# Patient Record
Sex: MALE | Race: WHITE
[De-identification: names, ages, dates, MRNs, and addresses within clinical notes are randomized per-mention and may not be internally consistent; named-entity substitution may affect disease eponyms.]

---

## 2017-05-27 ENCOUNTER — HOSPITAL ENCOUNTER (OUTPATIENT)
Dept: HOSPITAL 76 - DI | Age: 67
Discharge: HOME | End: 2017-05-27
Attending: FAMILY MEDICINE
Payer: COMMERCIAL

## 2017-05-27 DIAGNOSIS — K76.0: Primary | ICD-10-CM

## 2017-05-27 PROCEDURE — 76705 ECHO EXAM OF ABDOMEN: CPT

## 2017-05-27 NOTE — ULTRASOUND REPORT
EXAM:

ABDOMEN ULTRASOUND LIMITED, RUQ

 

EXAM DATE: 5/27/2017 01:35 PM.

 

CLINICAL HISTORY: Abdominal pain, right upper quadrant.

 

COMPARISON: None.

 

TECHNIQUE: Real-time scanning was performed with static images obtained. 

 

FINDINGS: 

Liver: Liver parenchyma is heterogeneous and markedly hyperechoic. No discrete liver masses or intrah
epatic bile duct dilation.  However, evaluation for masses is limited secondary to the echogenicity. 
17.4 cm. Main portal vein flow: Hepatopetal.

 

Gallbladder: Normal. No stones, wall thickening, or sonographic Coley's sign. 

 

Biliary System: CBD measures 7.1 mm. No intrahepatic or extrahepatic ductal dilatation.

 

Right kidney: Length measures 11.9 cm. No hydronephrosis. 

 

Other: None. 

 

IMPRESSION: 

1. Enlarged fatty liver. No mass. 

2. Normal gallbladder and common bile duct.

 

[facility]

Referring Provider Line: [phone]

 

SITE ID: [id number]

## 2018-04-06 ENCOUNTER — HOSPITAL ENCOUNTER (EMERGENCY)
Dept: HOSPITAL 76 - ED | Age: 68
Discharge: HOME | End: 2018-04-06
Payer: COMMERCIAL

## 2018-04-06 VITALS — SYSTOLIC BLOOD PRESSURE: 154 MMHG | DIASTOLIC BLOOD PRESSURE: 93 MMHG

## 2018-04-06 DIAGNOSIS — I10: ICD-10-CM

## 2018-04-06 DIAGNOSIS — F02.80: ICD-10-CM

## 2018-04-06 DIAGNOSIS — M25.761: Primary | ICD-10-CM

## 2018-04-06 DIAGNOSIS — G30.9: ICD-10-CM

## 2018-04-06 DIAGNOSIS — M11.261: ICD-10-CM

## 2018-04-06 DIAGNOSIS — E78.00: ICD-10-CM

## 2018-04-06 DIAGNOSIS — E03.9: ICD-10-CM

## 2018-04-06 PROCEDURE — 99283 EMERGENCY DEPT VISIT LOW MDM: CPT

## 2018-04-06 NOTE — XRAY REPORT
EXAM:

RIGHT KNEE RADIOGRAPHY

 

EXAM DATE: 4/6/2018 08:39 PM.

 

CLINICAL HISTORY: Knee pain.

 

COMPARISON: None.

 

TECHNIQUE: 4 views.

 

FINDINGS: 

Bones:  No fracture or focal bony lesion. 

 

Joints:  No evidence of dislocation. Joint spacing is maintained. There are patellofemoral compartmen
t marginal osteophytes. There is no chondrocalcinosis. 

 

Soft Tissues:  No unexpected soft tissue findings. 

 

IMPRESSION: 

1. No evidence of fracture or dislocation. 

2. No joint effusion. 

3. There is chondrocalcinosis. 

4. There are small patellofemoral compartment marginal osteophytes.

 

[facility]  

Referring Provider Line: [phone]

 

SITE ID: [id number]

## 2018-04-06 NOTE — ED PHYSICIAN DOCUMENTATION
PD HPI LOWER EXT INJURY





- Stated complaint


Stated Complaint: BACK PX,RT KNEE PX





- Chief complaint


Chief Complaint: Ext Problem





- History obtained from


History obtained from: Patient





- History of Present Illness


PD HPI LOW EXT INJURY LOCATION: Other (He went for a walk a little under week 

ago and that night developed some right thoracic back pain was much worse with 

bending or lifting.  He went to his doctor was prescribed a muscle relaxer, 

that is now much better but starting 2 nights ago he developed atraumatic right 

knee pain and popping which is at times severe without specific trauma, swelling

, fevers.)





Review of Systems


Constitutional: denies: Fever, Chills


Cardiac: denies: Chest pain / pressure, Palpitations


Respiratory: denies: Dyspnea, Cough





PD PAST MEDICAL HISTORY





- Past Medical History


Past Medical History: Yes


Cardiovascular: Hypertension, High cholesterol


Respiratory: Sleep apnea


Neuro: Alzhiemer's


Endocrine/Autoimmune: HyPOthyroidism


GI: None


: None


HEENT: None


Psych: None


Musculoskeletal: Osteoporosis


Derm: None





- Past Surgical History


Past Surgical History: Yes


Ortho: Arthroscopic surgery


HEENT: Tonsil/Adenoidectomy





- Present Medications


Home Medications: 


 Ambulatory Orders











 Medication  Instructions  Recorded  Confirmed


 


Baclofen 10 mg PO TID 04/06/18 04/06/18


 


HYDROcod/ACETAM 5/325 [Norco 5/325] 1 - 2 ea PO Q6H PRN #10 tablet 04/06/18 


 


Ibuprofen 2 cap PO Q6HR PRN 04/06/18 04/06/18


 


Levothyroxine [Synthroid] 1 tab PO DAILY 04/06/18 04/06/18


 


Simvastatin 1 tab PO DAILY 04/06/18 04/06/18














- Allergies


Allergies/Adverse Reactions: 


 Allergies











Allergy/AdvReac Type Severity Reaction Status Date / Time


 


No Known Drug Allergies Allergy   Verified 04/06/18 18:50














- Social History


Does the pt smoke?: No


Smoking Status: Never smoker


Does the pt drink ETOH?: Yes


Does the pt have substance abuse?: No





- Immunizations


Immunizations are current?: Yes





- POLST


Patient has POLST: No





PD ED PE NORMAL





- Vitals


Vital signs reviewed: Yes





- General


General: Alert and oriented X 3, No acute distress





- Cardiac


Cardiac: RRR, No murmur





- Respiratory


Respiratory: No respiratory distress, Clear bilaterally





- Abdomen


Abdomen: Non tender





- Back


Back: No spinal TTP





- Derm


Derm: Normal color, Warm and dry





- Extremities


Extremities: Other (Right knee is nontender without an effusion, there is no 

warmth or redness and he is ranging it painlessly.)





- Neuro


Neuro: Alert and oriented X 3, Normal speech





- Psych


Psych: Normal mood, Normal affect





Results





- Vitals


Vitals: 


 Vital Signs - 24 hr











  04/06/18





  18:42


 


Temperature 36.8 C


 


Heart Rate 77


 


Respiratory 16





Rate 


 


Blood Pressure 154/93 H


 


O2 Saturation 96








 Oxygen











O2 Source                      Room air

















- Rads (name of study)


  ** R Knee


Radiology: EMP read contemporaneously (Chondrocalcinosis and small osteophytes)





PD MEDICAL DECISION MAKING





- ED course


ED course: 





67-year-old gentleman with bad knee pain, better now, there is no evidence of 

infection or warmth or effusion on examination.  He does have evidence of 

osteophytes and calcific foreign bodies in the joint.  He was administered for 

Vicodin to go here and he will follow-up with his doctor on Monday.





Departure





- Departure


Disposition: 01 Home, Self Care


Clinical Impression: 


 Osteophyte determined by x-ray





Condition: Good


Record reviewed to determine appropriate education?: Yes


Instructions:  Knee Osteoarthritis


Follow-Up: 


Linda Torres MD [Primary Care Provider] - 04/09/18


Prescriptions: 


HYDROcod/ACETAM 5/325 [Norco 5/325] 1 - 2 ea PO Q6H PRN #10 tablet


 PRN Reason: Pain


Comments: 


Call your doctor to arrange a follow-up appointment, make the next available 

appointment.  In the interim, return anytime if worse or if new symptoms 

develop.





Your blood pressure was elevated today on check into the emergency department.  

This does not mean that you have hypertension, it is a common phenomenon to 

come to the emergency department and have elevated blood pressure.  I recommend 

that you see your primary care physician within the week to have it rechecked 

when you are feeling better.

## 2018-10-04 ENCOUNTER — HOSPITAL ENCOUNTER (OUTPATIENT)
Dept: HOSPITAL 76 - DI | Age: 68
Discharge: HOME | End: 2018-10-04
Attending: ORTHOPAEDIC SURGERY
Payer: COMMERCIAL

## 2018-10-04 DIAGNOSIS — M23.203: Primary | ICD-10-CM

## 2018-10-04 DIAGNOSIS — M17.11: ICD-10-CM

## 2018-11-09 ENCOUNTER — HOSPITAL ENCOUNTER (OUTPATIENT)
Dept: HOSPITAL 76 - DI | Age: 68
Discharge: HOME | End: 2018-11-09
Attending: ORTHOPAEDIC SURGERY
Payer: COMMERCIAL

## 2018-11-09 DIAGNOSIS — S93.491D: ICD-10-CM

## 2018-11-09 DIAGNOSIS — S93.491A: Primary | ICD-10-CM

## 2018-11-09 DIAGNOSIS — S93.411D: ICD-10-CM

## 2018-11-09 DIAGNOSIS — M19.071: ICD-10-CM

## 2018-11-09 DIAGNOSIS — M66.861: ICD-10-CM

## 2018-11-09 NOTE — MRI REPORT
Reason:  NONTRAUMATIC RUPTURE OF TIBIALIS POSTERIOR TENDON

Procedure Date:  11/09/2018   

Accession Number:  [id number] / [id number]                    

Procedure:  MRI - Foot RT W/O CPT Code:  

 

FULL RESULT:

 

 

EXAM:

RIGHT ANKLE/HINDFOOT MRI WITHOUT CONTRAST

 

EXAM DATE: 11/9/2018 10:15 AM.

 

CLINICAL HISTORY: Nontraumatic rupture of tibialis posterior tendon.

 

COMPARISON: FOOT 3 VIEW RT 10/25/2018 10:01 AM.

 

TECHNIQUE: Multiplanar, multisequence T1-weighted and fluid-sensitive 

sequences of the ankle/hindfoot without contrast. Other: None.

 

FINDINGS:

Bones: There is thinning of the hyaline cartilage of the talonavicular 

joint with subchondral edema, cyst formation and osteophyte formation 

consistent with moderate osteoarthritis. There is subchondral edema and 

sclerosis in the posterior process of the talus at the posterior subtalar 

joint. There is subchondral sclerosis of the adjacent calcaneum as well. 

The findings are consistent with osteoarthritis, possibly secondary to 

prior trauma.

 

Articular Cartilage: See above.

 

Ligaments: The tibiofibular ligaments are intact. There is an acute grade 

3 tear of the anterior talofibular ligament, the torn ligament fibers are 

visible anterior to the talus. There is an old partial-thickness tear of 

the calcaneofibular and posterior talofibular ligaments. There is a 

high-grade partial-thickness tear of the spring ligament. The remainder 

of the deltoid ligament appears thinned, suggesting a prior 

partial-thickness tear.

 

Anterior Tendons: The tibialis anterior, extensor hallucis longus, and 

extensor digitorum longus tendons are unremarkable.

 

Medial Tendons: There is thickening and increased T2 signal within the 

tibialis posterior consistent with tendinosis and a partial-thickness 

tear immediately adjacent to its navicular insertion. The remaining 

medial tendons appear unremarkable.

 

Lateral Tendons: The peroneal tendons appear unremarkable.

 

Achilles Tendon: The Achilles tendon appears slightly thickened 

throughout its visible length, without significant alteration in signal. 

The findings may indicate low-grade Achilles tendinosis.

 

Musculature: No edema or fatty atrophy.

 

Other: Small ankle joint effusion. The contents of the sinus tarsi and 

tarsal tunnel are unremarkable. No plantar fasciitis. There is 

subcutaneous edema surrounding the ankle.

IMPRESSION:

1. Acute grade 3 tear of the anterior talofibular ligament.

2. Prior partial-thickness tears of the spring, deltoid ligament, 

calcaneofibular and posterior talofibular ligaments.

3. 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 

talonavicular joint with joint effusions, which may indicate prior trauma.

4. Tibialis posterior tendinosis and partial-thickness tear adjacent to 

its navicular insertion.

5. Possible low-grade Achilles tendinosis.

 

[facility] MUSCULOSKELETAL RADIOLOGY SECTION

## 2018-12-19 ENCOUNTER — HOSPITAL ENCOUNTER (OUTPATIENT)
Dept: HOSPITAL 76 - DI | Age: 68
Discharge: HOME | End: 2018-12-19
Attending: FAMILY MEDICINE
Payer: COMMERCIAL

## 2018-12-19 DIAGNOSIS — R31.0: Primary | ICD-10-CM

## 2018-12-19 LAB
ANION GAP SERPL CALCULATED.4IONS-SCNC: 8 MMOL/L (ref 6–13)
BUN SERPL-MCNC: 9 MG/DL (ref 6–20)
CALCIUM UR-MCNC: 9.1 MG/DL (ref 8.5–10.3)
CHLORIDE SERPL-SCNC: 103 MMOL/L (ref 101–111)
CO2 SERPL-SCNC: 27 MMOL/L (ref 21–32)
CREAT SERPLBLD-SCNC: 0.9 MG/DL (ref 0.6–1.2)
GFRSERPLBLD MDRD-ARVRAT: 83 ML/MIN/{1.73_M2} (ref 89–?)
GLUCOSE SERPL-MCNC: 108 MG/DL (ref 70–100)
SODIUM SERPLBLD-SCNC: 138 MMOL/L (ref 135–145)

## 2018-12-19 PROCEDURE — 74178 CT ABD&PLV WO CNTR FLWD CNTR: CPT

## 2018-12-19 PROCEDURE — 36415 COLL VENOUS BLD VENIPUNCTURE: CPT

## 2018-12-19 PROCEDURE — 80048 BASIC METABOLIC PNL TOTAL CA: CPT

## 2018-12-19 NOTE — CT REPORT
Reason:  GROSS HEMATURIA

Procedure Date:  12/19/2018   

Accession Number:  [id number] / [id number]                    

Procedure:  CT  - IVP CPT Code:  

 

FULL RESULT:

 

 

EXAM:

CT ABDOMEN AND PELVIS WITHOUT AND WITH CONTRAST (CT IVP)

 

EXAM DATE: 12/19/2018 12:32 PM.

 

CLINICAL HISTORY: Gross hematuria.

 

COMPARISONS: None.

 

TECHNIQUE: Routine helical imaging was performed through the kidneys, 

ureters and bladder in the precontrast, postcontrast and delayed phase. 

IV Contrast: [id number] 100mL. Reconstructions: Coronal and sagittal.

 

In accordance with CT protocol optimization, one or more of the following 

dose reduction techniques were utilized for this exam: automated exposure 

control, adjustment of mA and/or KV based on patient size, or use of 

iterative reconstructive technique.

 

FINDINGS:

Lung Bases: Unremarkable.

 

Right Kidney/Ureter: No stones, hydronephrosis, or masses. Right ureter 

is patent and normally opacifies with contrast.

 

Left Kidney/Ureter: No stones, hydronephrosis, or masses. Left ureter is 

patent and normally opacifies with contrast.

 

Other Solid Organs: The liver is hypodense consistent with steatosis. The 

hepatic and portal veins are normally patent. The spleen, pancreas, 

gallbladder, and adrenal glands are unremarkable.The bile ducts are 

unremarkable.

 

Peritoneal Cavity/Bowel: Normal. No free fluid, free air or adenopathy. 

No masses.  Bowel loops are unremarkable.

 

Pelvic Organs: No bladder stones, obstruction or masses. The visualized 

pelvic organs are unremarkable.

 

Vasculature: Normal.

 

Bones: Normal.

 

Other: None.

IMPRESSION: Normal CT IVP; etiology for gross hematuria not identified on 

this exam. No urinary tract masses, stones or obstruction.

 

[facility]